# Patient Record
Sex: FEMALE | Race: OTHER | Employment: UNEMPLOYED | URBAN - METROPOLITAN AREA
[De-identification: names, ages, dates, MRNs, and addresses within clinical notes are randomized per-mention and may not be internally consistent; named-entity substitution may affect disease eponyms.]

---

## 2020-06-21 ENCOUNTER — APPOINTMENT (OUTPATIENT)
Dept: CT IMAGING | Age: 60
End: 2020-06-21
Attending: NURSE PRACTITIONER
Payer: MEDICAID

## 2020-06-21 ENCOUNTER — HOSPITAL ENCOUNTER (OUTPATIENT)
Age: 60
Setting detail: OBSERVATION
Discharge: HOME | End: 2020-06-22
Attending: EMERGENCY MEDICINE | Admitting: HOSPITALIST
Payer: MEDICAID

## 2020-06-21 ENCOUNTER — APPOINTMENT (OUTPATIENT)
Dept: GENERAL RADIOLOGY | Age: 60
End: 2020-06-21
Attending: NURSE PRACTITIONER
Payer: MEDICAID

## 2020-06-21 DIAGNOSIS — R55 VASOVAGAL EPISODE: ICD-10-CM

## 2020-06-21 DIAGNOSIS — G45.9 TIA (TRANSIENT ISCHEMIC ATTACK): Primary | ICD-10-CM

## 2020-06-21 DIAGNOSIS — R55 SYNCOPE AND COLLAPSE: ICD-10-CM

## 2020-06-21 LAB
ALBUMIN SERPL-MCNC: 4.1 G/DL (ref 3.4–5)
ALBUMIN/GLOB SERPL: 1 {RATIO} (ref 0.8–1.7)
ALP SERPL-CCNC: 110 U/L (ref 45–117)
ALT SERPL-CCNC: 103 U/L (ref 13–56)
ANION GAP SERPL CALC-SCNC: 9 MMOL/L (ref 3–18)
AST SERPL-CCNC: 70 U/L (ref 10–38)
BASOPHILS # BLD: 0.1 K/UL (ref 0–0.1)
BASOPHILS NFR BLD: 1 % (ref 0–2)
BILIRUB SERPL-MCNC: 0.4 MG/DL (ref 0.2–1)
BUN SERPL-MCNC: 15 MG/DL (ref 7–18)
BUN/CREAT SERPL: 20 (ref 12–20)
CALCIUM SERPL-MCNC: 9.5 MG/DL (ref 8.5–10.1)
CHLORIDE SERPL-SCNC: 107 MMOL/L (ref 100–111)
CK MB CFR SERPL CALC: 0.6 % (ref 0–4)
CK MB SERPL-MCNC: 1 NG/ML (ref 5–25)
CK SERPL-CCNC: 157 U/L (ref 26–192)
CO2 SERPL-SCNC: 24 MMOL/L (ref 21–32)
CREAT SERPL-MCNC: 0.74 MG/DL (ref 0.6–1.3)
DIFFERENTIAL METHOD BLD: NORMAL
EOSINOPHIL # BLD: 0 K/UL (ref 0–0.4)
EOSINOPHIL NFR BLD: 0 % (ref 0–5)
ERYTHROCYTE [DISTWIDTH] IN BLOOD BY AUTOMATED COUNT: 13.4 % (ref 11.6–14.5)
GLOBULIN SER CALC-MCNC: 4.3 G/DL (ref 2–4)
GLUCOSE SERPL-MCNC: 106 MG/DL (ref 74–99)
HCT VFR BLD AUTO: 40.8 % (ref 35–45)
HGB BLD-MCNC: 13.5 G/DL (ref 12–16)
LYMPHOCYTES # BLD: 2.1 K/UL (ref 0.9–3.6)
LYMPHOCYTES NFR BLD: 28 % (ref 21–52)
MAGNESIUM SERPL-MCNC: 2.4 MG/DL (ref 1.6–2.6)
MCH RBC QN AUTO: 29.5 PG (ref 24–34)
MCHC RBC AUTO-ENTMCNC: 33.1 G/DL (ref 31–37)
MCV RBC AUTO: 89.1 FL (ref 74–97)
MONOCYTES # BLD: 0.5 K/UL (ref 0.05–1.2)
MONOCYTES NFR BLD: 7 % (ref 3–10)
NEUTS SEG # BLD: 5 K/UL (ref 1.8–8)
NEUTS SEG NFR BLD: 64 % (ref 40–73)
PLATELET # BLD AUTO: 333 K/UL (ref 135–420)
PMV BLD AUTO: 10.4 FL (ref 9.2–11.8)
POTASSIUM SERPL-SCNC: 3.9 MMOL/L (ref 3.5–5.5)
PROT SERPL-MCNC: 8.4 G/DL (ref 6.4–8.2)
RBC # BLD AUTO: 4.58 M/UL (ref 4.2–5.3)
SODIUM SERPL-SCNC: 140 MMOL/L (ref 136–145)
TROPONIN I SERPL-MCNC: <0.02 NG/ML (ref 0–0.04)
WBC # BLD AUTO: 7.6 K/UL (ref 4.6–13.2)

## 2020-06-21 PROCEDURE — 74011250637 HC RX REV CODE- 250/637: Performed by: NURSE PRACTITIONER

## 2020-06-21 PROCEDURE — 71045 X-RAY EXAM CHEST 1 VIEW: CPT

## 2020-06-21 PROCEDURE — 80053 COMPREHEN METABOLIC PANEL: CPT

## 2020-06-21 PROCEDURE — 74011250636 HC RX REV CODE- 250/636: Performed by: HOSPITALIST

## 2020-06-21 PROCEDURE — 85025 COMPLETE CBC W/AUTO DIFF WBC: CPT

## 2020-06-21 PROCEDURE — 96372 THER/PROPH/DIAG INJ SC/IM: CPT

## 2020-06-21 PROCEDURE — 82550 ASSAY OF CK (CPK): CPT

## 2020-06-21 PROCEDURE — 99218 HC RM OBSERVATION: CPT

## 2020-06-21 PROCEDURE — 83735 ASSAY OF MAGNESIUM: CPT

## 2020-06-21 PROCEDURE — 93005 ELECTROCARDIOGRAM TRACING: CPT

## 2020-06-21 PROCEDURE — 74011250637 HC RX REV CODE- 250/637: Performed by: HOSPITALIST

## 2020-06-21 PROCEDURE — 65660000000 HC RM CCU STEPDOWN

## 2020-06-21 PROCEDURE — 70450 CT HEAD/BRAIN W/O DYE: CPT

## 2020-06-21 PROCEDURE — 99285 EMERGENCY DEPT VISIT HI MDM: CPT

## 2020-06-21 RX ORDER — ASPIRIN 325 MG
325 TABLET ORAL
Status: COMPLETED | OUTPATIENT
Start: 2020-06-21 | End: 2020-06-21

## 2020-06-21 RX ORDER — GUAIFENESIN 100 MG/5ML
81 LIQUID (ML) ORAL DAILY
Status: DISCONTINUED | OUTPATIENT
Start: 2020-06-22 | End: 2020-06-22 | Stop reason: HOSPADM

## 2020-06-21 RX ORDER — ACETAMINOPHEN 500 MG
500 TABLET ORAL
Status: DISCONTINUED | OUTPATIENT
Start: 2020-06-21 | End: 2020-06-22 | Stop reason: HOSPADM

## 2020-06-21 RX ORDER — ENOXAPARIN SODIUM 100 MG/ML
40 INJECTION SUBCUTANEOUS EVERY 24 HOURS
Status: DISCONTINUED | OUTPATIENT
Start: 2020-06-21 | End: 2020-06-22 | Stop reason: HOSPADM

## 2020-06-21 RX ORDER — ONDANSETRON 2 MG/ML
4 INJECTION INTRAMUSCULAR; INTRAVENOUS
Status: DISCONTINUED | OUTPATIENT
Start: 2020-06-21 | End: 2020-06-22 | Stop reason: HOSPADM

## 2020-06-21 RX ORDER — LORAZEPAM 2 MG/ML
1 INJECTION INTRAMUSCULAR
Status: COMPLETED | OUTPATIENT
Start: 2020-06-22 | End: 2020-06-22

## 2020-06-21 RX ORDER — ACETAMINOPHEN 325 MG/1
650 TABLET ORAL
Status: COMPLETED | OUTPATIENT
Start: 2020-06-21 | End: 2020-06-21

## 2020-06-21 RX ORDER — ATORVASTATIN CALCIUM 40 MG/1
80 TABLET, FILM COATED ORAL
Status: DISCONTINUED | OUTPATIENT
Start: 2020-06-21 | End: 2020-06-22 | Stop reason: HOSPADM

## 2020-06-21 RX ADMIN — ENOXAPARIN SODIUM 40 MG: 40 INJECTION SUBCUTANEOUS at 23:44

## 2020-06-21 RX ADMIN — ATORVASTATIN CALCIUM 80 MG: 40 TABLET, FILM COATED ORAL at 23:44

## 2020-06-21 RX ADMIN — ASPIRIN 325 MG ORAL TABLET 325 MG: 325 PILL ORAL at 23:44

## 2020-06-21 RX ADMIN — ACETAMINOPHEN 650 MG: 325 TABLET ORAL at 21:47

## 2020-06-21 NOTE — ED TRIAGE NOTES
Pt presents via EMS from home after a syncopal episode lasting aprox 10 min per family report. Per EMS report patient 's/100's with complaints of headache. Pt blood glucose 129mg/dl. Upon arrival patient AAOx4, VSS.     Past Medical History:   Diagnosis Date    Arthritis     Carpal tunnel syndrome     left    Elevated cholesterol     Hypertension     Numbness     left hand     Shortness of breath     Wrist pain     left

## 2020-06-21 NOTE — ED PROVIDER NOTES
[facility]  Emergency Department Treatment Report        7:40 PM Cheri Skinner is a 60 y.o. female with a history of hypertension and diabetes who presents to ED after a episode of syncope. Patient had family drama and up sentiment today and went to hug someone on the front lawn and fainted patient was reportedly out for 10 minutes when she woke she had difficulty moving her left arm and gripping with that left hand. No facial droop was noted at any time patient does report a headache prior to it happening and a headache at this time. Patient does have a history of fainting like this with emotional upset in the past.  At this point in time patient only reports a headache. No other complaints, associated symptoms or modifying factors at this time. PCP: Sonny Gandara      The history is provided by the patient. The history is limited by a language barrier. A  was used (Patient's daughter consented to translating for her mother and [place] and [facility] [place] [place] language). Syncope   This is a recurrent problem. The current episode started 3 to 5 hours ago. The problem has been gradually improving. Associated symptoms include headaches. Pertinent negatives include no chest pain, no abdominal pain and no shortness of breath. Nothing aggravates the symptoms. Nothing relieves the symptoms. She has tried nothing for the symptoms.         Past Medical History:   Diagnosis Date    Arthritis     Carpal tunnel syndrome     left    Diabetes (HCC)     Elevated cholesterol     Hypertension     Numbness     left hand     Shortness of breath     Wrist pain     left       Past Surgical History:   Procedure Laterality Date    HX APPENDECTOMY      HX HYSTERECTOMY           Family History:   Problem Relation Age of Onset    Cancer Sister     Liver Disease Brother        Social History     Socioeconomic History    Marital status:      Spouse name: Not on file    Number of children: Not on file    Years of education: Not on file    Highest education level: Not on file   Occupational History    Not on file   Social Needs    Financial resource strain: Not on file    Food insecurity     Worry: Not on file     Inability: Not on file    Transportation needs     Medical: Not on file     Non-medical: Not on file   Tobacco Use    Smoking status: Never Smoker    Smokeless tobacco: Never Used   Substance and Sexual Activity    Alcohol use: No    Drug use: No    Sexual activity: Not on file   Lifestyle    Physical activity     Days per week: Not on file     Minutes per session: Not on file    Stress: Not on file   Relationships    Social connections     Talks on phone: Not on file     Gets together: Not on file     Attends [employer] witness service: Not on file     Active member of club or organization: Not on file     Attends meetings of clubs or organizations: Not on file     Relationship status: Not on file    Intimate partner violence     Fear of current or ex partner: Not on file     Emotionally abused: Not on file     Physically abused: Not on file     Forced sexual activity: Not on file   Other Topics Concern    Not on file   Social History Narrative    Not on file         ALLERGIES: Patient has no known allergies. Review of Systems   Constitutional: Negative for fever. Respiratory: Negative for chest tightness and shortness of breath. Cardiovascular: Positive for syncope. Negative for chest pain and palpitations. Gastrointestinal: Negative for abdominal pain. Neurological: Positive for dizziness, syncope and headaches. Negative for tremors, seizures, speech difficulty and weakness. All other systems reviewed and are negative. Vitals:    06/21/20 1838 06/21/20 1915   BP: 166/84 156/81   Pulse: 95 95   Resp: 20 20   Temp: 99.9 °F (37.7 °C)    SpO2: 96% 95%            Physical Exam  Vitals signs and nursing note reviewed. Constitutional:       Appearance: She is well-developed. HENT:      Head: Normocephalic and atraumatic. Eyes:      General: No visual field deficit. Conjunctiva/sclera: Conjunctivae normal.      Pupils: Pupils are equal, round, and reactive to light. Neck:      Musculoskeletal: Normal range of motion and neck supple. Cardiovascular:      Rate and Rhythm: Normal rate and regular rhythm. Pulmonary:      Effort: Pulmonary effort is normal.      Breath sounds: Normal breath sounds. Abdominal:      General: Bowel sounds are normal.      Palpations: Abdomen is soft. Musculoskeletal: Normal range of motion. Skin:     General: Skin is warm and dry. Neurological:      Mental Status: She is alert and oriented to person, place, and time. GCS: GCS eye subscore is 4. GCS verbal subscore is 5. GCS motor subscore is 6. Cranial Nerves: Cranial nerve deficit present. No facial asymmetry. Sensory: Sensation is intact. Motor: Weakness present. No atrophy or abnormal muscle tone. Gait: Gait is intact. Deep Tendon Reflexes: Reflexes are normal and symmetric. Comments: Tongue deviates to right    Mild left-sided weakness to hands with  strength and leg on left side    Gait observed normal, when going into see patient she had ambulated to the bathroom   Psychiatric:         Behavior: Behavior normal.         Thought Content: Thought content normal.         Judgment: Judgment normal.          MDM  Number of Diagnoses or Management Options  Syncope and collapse:   TIA (transient ischemic attack):   Vasovagal episode:   Diagnosis management comments: After a quick neuro exam patient had loss of consciousness for 10 minutes had trouble gripping and moving her left arm after fainting. She had this symptom for 15 minutes and on exam patient had a faint left-sided weakness and a feet deviation of her tongue to the right.    Stroke called tele-neuro consult placed    Discussed with Dr. Reanna Mcnally with tele-neuro he examined the patient and he thinks the patient either had a TIA or a vasovagal episode with syncope. He suggested patient get aspirin get admitted to the hospital for an MRI and further evaluation. Discussed with , hospitalist she accepted the patient for admission to the neuro telemetry floor    In to discuss disposition and admission with daughter and patient       Amount and/or Complexity of Data Reviewed  Clinical lab tests: ordered and reviewed  Tests in the radiology section of CPT®: ordered and reviewed  Review and summarize past medical records: yes  Discuss the patient with other providers: yes  Independent visualization of images, tracings, or specimens: yes    Risk of Complications, Morbidity, and/or Mortality  Presenting problems: moderate  Diagnostic procedures: moderate  Management options: moderate    Critical Care  Total time providing critical care: < 30 minutes (I have personally provided _30__ minutes of critical care time exclusive of time spent on separately billable procedures. Time includes review of laboratory data, radiology results, discussion with consultants, and monitoring for potential decompensation.  Interventions were performed as documented above.  )    Patient Progress  Patient progress: stable         Procedures            Vitals:  Patient Vitals for the past 12 hrs:   Temp Pulse Resp BP SpO2   06/21/20 1915  95 20 156/81 95 %   06/21/20 1838 99.9 °F (37.7 °C) 95 20 166/84 96 %       Medications ordered:   Medications - No data to display      Lab findings:  Recent Results (from the past 12 hour(s))   CBC WITH AUTOMATED DIFF    Collection Time: 06/21/20  6:32 PM   Result Value Ref Range    WBC 7.6 4.6 - 13.2 K/uL    RBC 4.58 4.20 - 5.30 M/uL    HGB 13.5 12.0 - 16.0 g/dL    HCT 40.8 35.0 - 45.0 %    MCV 89.1 74.0 - 97.0 FL    MCH 29.5 24.0 - 34.0 PG    MCHC 33.1 31.0 - 37.0 g/dL    RDW 13.4 11.6 - 14.5 %    PLATELET 115 177 - 285 K/uL    MPV 10.4 9.2 - 11.8 FL    NEUTROPHILS 64 40 - 73 % LYMPHOCYTES 28 21 - 52 %    MONOCYTES 7 3 - 10 %    EOSINOPHILS 0 0 - 5 %    BASOPHILS 1 0 - 2 %    ABS. NEUTROPHILS 5.0 1.8 - 8.0 K/UL    ABS. LYMPHOCYTES 2.1 0.9 - 3.6 K/UL    ABS. MONOCYTES 0.5 0.05 - 1.2 K/UL    ABS. EOSINOPHILS 0.0 0.0 - 0.4 K/UL    ABS. BASOPHILS 0.1 0.0 - 0.1 K/UL    DF AUTOMATED     METABOLIC PANEL, COMPREHENSIVE    Collection Time: 06/21/20  6:32 PM   Result Value Ref Range    Sodium 140 136 - 145 mmol/L    Potassium 3.9 3.5 - 5.5 mmol/L    Chloride 107 100 - 111 mmol/L    CO2 24 21 - 32 mmol/L    Anion gap 9 3.0 - 18 mmol/L    Glucose 106 (H) 74 - 99 mg/dL    BUN 15 7.0 - 18 MG/DL    Creatinine 0.74 0.6 - 1.3 MG/DL    BUN/Creatinine ratio 20 12 - 20      GFR est AA >60 >60 ml/min/1.73m2    GFR est non-AA >60 >60 ml/min/1.73m2    Calcium 9.5 8.5 - 10.1 MG/DL    Bilirubin, total 0.4 0.2 - 1.0 MG/DL    ALT (SGPT) 103 (H) 13 - 56 U/L    AST (SGOT) 70 (H) 10 - 38 U/L    Alk.  phosphatase 110 45 - 117 U/L    Protein, total 8.4 (H) 6.4 - 8.2 g/dL    Albumin 4.1 3.4 - 5.0 g/dL    Globulin 4.3 (H) 2.0 - 4.0 g/dL    A-G Ratio 1.0 0.8 - 1.7     MAGNESIUM    Collection Time: 06/21/20  6:32 PM   Result Value Ref Range    Magnesium 2.4 1.6 - 2.6 mg/dL   CARDIAC PANEL,(CK, CKMB & TROPONIN)    Collection Time: 06/21/20  6:32 PM   Result Value Ref Range    CK - MB 1.0 <3.6 ng/ml    CK-MB Index 0.6 0.0 - 4.0 %     26 - 192 U/L    Troponin-I, QT <0.02 0.0 - 0.045 NG/ML   EKG, 12 LEAD, INITIAL    Collection Time: 06/21/20  6:50 PM   Result Value Ref Range    Ventricular Rate 92 BPM    Atrial Rate 92 BPM    P-R Interval 152 ms    QRS Duration 76 ms    Q-T Interval 364 ms    QTC Calculation (Bezet) 450 ms    Calculated P Axis 52 degrees    Calculated R Axis 28 degrees    Calculated T Axis 28 degrees    Diagnosis       Normal sinus rhythm  Normal ECG  No previous ECGs available             X-Ray, CT or other radiology findings or impressions:  XR CHEST PORT    (Results Pending)   CT HEAD WO CONT    (Results Pending)     Radiology: CT of head was negative chest x-ray negative      Progress notes, Consult notes or additional Procedure notes:     Dr. Romana Pierce with tele-neuro for consultation and stroke alert    Discussed with , hospitalist.       Disposition:    Diagnosis:   1. TIA (transient ischemic attack)    2. Vasovagal episode    3. Syncope and collapse        Disposition: Admitted           Patient's Medications   Start Taking    No medications on file   Continue Taking    CELECOXIB (CELEBREX) 100 MG CAPSULE    Take  by mouth daily. CHOLECALCIFEROL (VITAMIN D3) 1,000 UNIT CAP    Take  by mouth daily. HYDROCHLOROTHIAZIDE (HYDRODIURIL) 25 MG TABLET    Take 12.5 mg by mouth daily. HYDROCODONE-ACETAMINOPHEN (VICODIN) 5-300 MG TABLET    Take 1 Tab by mouth every four (4) hours as needed. Max Daily Amount: 6 Tabs. SIMVASTATIN (ZOCOR) 10 MG TABLET    Take 20 mg by mouth nightly. These Medications have changed    No medications on file   Stop Taking    No medications on file         ADMISSION NOTE:  8:44 PM  Patient is being admitted to the hospital by Dr. Adal Stroud. The results of their tests and reasons for their admission have been discussed with them and/or available family. They convey agreement and understanding for the need to be admitted and for their admission diagnosis. Julian Ta ENP-C,FNP-C      Dragon voice recognition was used to generate this report, which may have resulted in some phonetic based errors in grammar and contents.  Even though attempts were made to correct all the mistakes, some may have been missed, and remained in the body of the document

## 2020-06-21 NOTE — ED NOTES
Per Daughter patient had a aprox 10 min syncopal episode today following a family conflict and has had other syncopal episodes in the past during stressful family events. Currently patient is AAOx4, VSS, neuro checks complete and no deficits discovered.

## 2020-06-22 ENCOUNTER — APPOINTMENT (OUTPATIENT)
Dept: MRI IMAGING | Age: 60
End: 2020-06-22
Attending: HOSPITALIST
Payer: MEDICAID

## 2020-06-22 VITALS
TEMPERATURE: 97.9 F | DIASTOLIC BLOOD PRESSURE: 75 MMHG | HEART RATE: 90 BPM | SYSTOLIC BLOOD PRESSURE: 123 MMHG | RESPIRATION RATE: 16 BRPM | OXYGEN SATURATION: 94 %

## 2020-06-22 LAB
ANION GAP SERPL CALC-SCNC: 6 MMOL/L (ref 3–18)
ATRIAL RATE: 92 BPM
BUN SERPL-MCNC: 15 MG/DL (ref 7–18)
BUN/CREAT SERPL: 26 (ref 12–20)
CALCIUM SERPL-MCNC: 9.3 MG/DL (ref 8.5–10.1)
CALCULATED P AXIS, ECG09: 52 DEGREES
CALCULATED R AXIS, ECG10: 28 DEGREES
CALCULATED T AXIS, ECG11: 28 DEGREES
CHLORIDE SERPL-SCNC: 106 MMOL/L (ref 100–111)
CHOLEST SERPL-MCNC: 224 MG/DL
CO2 SERPL-SCNC: 26 MMOL/L (ref 21–32)
CREAT SERPL-MCNC: 0.58 MG/DL (ref 0.6–1.3)
DIAGNOSIS, 93000: NORMAL
EST. AVERAGE GLUCOSE BLD GHB EST-MCNC: 137 MG/DL
GLUCOSE BLD STRIP.AUTO-MCNC: 109 MG/DL (ref 70–110)
GLUCOSE SERPL-MCNC: 97 MG/DL (ref 74–99)
HBA1C MFR BLD: 6.4 % (ref 4.2–5.6)
HDLC SERPL-MCNC: 51 MG/DL (ref 40–60)
HDLC SERPL: 4.4 {RATIO} (ref 0–5)
LDLC SERPL CALC-MCNC: 113.8 MG/DL (ref 0–100)
LIPID PROFILE,FLP: ABNORMAL
MAGNESIUM SERPL-MCNC: 2.6 MG/DL (ref 1.6–2.6)
P-R INTERVAL, ECG05: 152 MS
POTASSIUM SERPL-SCNC: 3.9 MMOL/L (ref 3.5–5.5)
Q-T INTERVAL, ECG07: 364 MS
QRS DURATION, ECG06: 76 MS
QTC CALCULATION (BEZET), ECG08: 450 MS
SODIUM SERPL-SCNC: 138 MMOL/L (ref 136–145)
TRIGL SERPL-MCNC: 296 MG/DL (ref ?–150)
VENTRICULAR RATE, ECG03: 92 BPM
VLDLC SERPL CALC-MCNC: 59.2 MG/DL

## 2020-06-22 PROCEDURE — 97162 PT EVAL MOD COMPLEX 30 MIN: CPT

## 2020-06-22 PROCEDURE — 74011250637 HC RX REV CODE- 250/637: Performed by: HOSPITALIST

## 2020-06-22 PROCEDURE — 83036 HEMOGLOBIN GLYCOSYLATED A1C: CPT

## 2020-06-22 PROCEDURE — 99218 HC RM OBSERVATION: CPT

## 2020-06-22 PROCEDURE — 36415 COLL VENOUS BLD VENIPUNCTURE: CPT

## 2020-06-22 PROCEDURE — 70551 MRI BRAIN STEM W/O DYE: CPT

## 2020-06-22 PROCEDURE — 96374 THER/PROPH/DIAG INJ IV PUSH: CPT

## 2020-06-22 PROCEDURE — 97116 GAIT TRAINING THERAPY: CPT

## 2020-06-22 PROCEDURE — 74011250636 HC RX REV CODE- 250/636: Performed by: HOSPITALIST

## 2020-06-22 PROCEDURE — 82962 GLUCOSE BLOOD TEST: CPT

## 2020-06-22 PROCEDURE — 83735 ASSAY OF MAGNESIUM: CPT

## 2020-06-22 PROCEDURE — 80048 BASIC METABOLIC PNL TOTAL CA: CPT

## 2020-06-22 PROCEDURE — 80061 LIPID PANEL: CPT

## 2020-06-22 RX ORDER — GUAIFENESIN 100 MG/5ML
81 LIQUID (ML) ORAL DAILY
Qty: 30 TAB | Refills: 0 | Status: SHIPPED | OUTPATIENT
Start: 2020-06-23 | End: 2021-01-05 | Stop reason: SDUPTHER

## 2020-06-22 RX ORDER — ATORVASTATIN CALCIUM 80 MG/1
80 TABLET, FILM COATED ORAL
Qty: 30 TAB | Refills: 0 | Status: SHIPPED | OUTPATIENT
Start: 2020-06-22 | End: 2020-11-03 | Stop reason: SDUPTHER

## 2020-06-22 RX ADMIN — ASPIRIN 81 MG 81 MG: 81 TABLET ORAL at 09:43

## 2020-06-22 RX ADMIN — LORAZEPAM 1 MG: 2 INJECTION INTRAMUSCULAR; INTRAVENOUS at 00:40

## 2020-06-22 NOTE — PROGRESS NOTES
SLP Note:    Pt seen with dtr present at bedside who assisted with communication. Denied dysphagia, speech or voice changes. No overt distress noted during breakfast meal.  Educated with regard to s/sx aspiration, role of SLP and to alert MD/RN if symptoms arise. Verbalized understanding. Will sign off as skilled SLP evaluation not indicated at this time.       Thank you for this referral,   Paulette Silver M.S., [id number] [facility]  Speech-Language Pathologist

## 2020-06-22 NOTE — PROGRESS NOTES
ARU/IPR REFERRAL CONTACT NOTE  [id number] [facility]    RE: Siomara Adair    Referral received to review this patient's case for admission to [id number] [facility]. Current status reviewed.  When appropriate, will need PT/OT evaluation/treatment on this patient to complete the pre-admission evaluation.  Will continue to follow. Thank you for this referral.  Should you have any questions please do not hesitate to call. Sincerely,  Andra Aceves.  [address] [place] [address] Physical Rehabilitation  [phone]

## 2020-06-22 NOTE — PROGRESS NOTES
OT order received and chart reviewed. Spoke with patient in her room and she declined need for skilled OT at this time. Patient reports being modified independent with basic self care tasks. Per PT, she requires CGA for mobility secondary to dizziness; will defer to PT for mobility training. She lives with her family who can assist her prn. No skilled OT indicated at this time; will complete the order.   Thank you for the referral.  Jearldine Hashimoto, MS OTR/L

## 2020-06-22 NOTE — PROGRESS NOTES
Reason for Admission:  Vasovagal episode [R55]  TIA (transient ischemic attack) [G45.9]                 RRAT Score:    5            Plan for utilizing home health: To be determined                      Likelihood of Readmission:   LOW                         Transition of Care Plan:              Initial assessment completed with patient and her son Kaylee Nicolas [id number]. Cognitive status of patient: oriented to time, place, person and situation. Face sheet information confirmed:  yes. The patient designates her Son Nitish Dunbar to participate in her discharge plan and to receive any needed information. This patient lives in a home with son. Patient is able to navigate steps as needed. Prior to hospitalization, patient was considered to be independent with ADLs/IADLS : yes . Patient has a current ACP document on file: no  The son will be available to transport patient home upon discharge. The patient already has no medical equipment available in the home. Patient is not currently active with home health. Patient has not stayed in a skilled nursing facility or rehab. Was  stay within last 60 days : no. This patient is on dialysis :no    Currently, the discharge plan is to be determined    The patient states that she can obtain her medications from the pharmacy, and take her medications as directed. Patient's current insurance is none. Left a message for Shaletta of [employer] to screen pt for Medicaid      Care Management Interventions  PCP Verified by CM: No(per pt, [facility] [address] [facility] (RRAT>21 & CHF Dx)?: No  Mode of Transport at Discharge:  Other (see comment)(family)  Transition of Care Consult (CM Consult): Discharge Planning  Physical Therapy Consult: Yes  Occupational Therapy Consult: Yes  Speech Therapy Consult: No  Current Support Network: Relative's Home(lives with her son Tresa Enriquez)  Confirm Follow Up Transport: Family  Discharge Location  Discharge Placement: Unable to determine at this time        JUAN Paredes RN  Care Management  Pager: [phone]

## 2020-06-22 NOTE — ROUTINE PROCESS
Discharge instructions reviewed with son as patient has limited [place] proficiency, son expressed understanding, prescription scripts given to son, and DC papers signed by patient. Patient left unit at

## 2020-06-22 NOTE — CONSULTS
NEUROLOGY CONSULT NOTE    Patient ID:  Mendel Atkins  [id number]  49 y.o.  1960    Date of Consultation:  June 22, 2020    Referring Physician: Jose A Vazquez MD    Reason for Consultation:  Dizziness/TIA        Subjective:       History of Present Illness:     Ms Bautista Espinoza is a 16-year-old woman, born in [place], with past medical history of hypertension, osteoarthritis, hyperlipidemia, who was admitted on 21 June 2020 after she became suddenly dizzy, shaking and confused. Paramedics found a systolic blood pressure in 230. Her symptoms started in the setting of a tests discussion with the family when she got very upset. When she was evaluated by tele-neurology she had a left pronator drift. Today she is feeling better. She is able to ambulate. She states that at home she is walking every day and that she is preparing meals and taking care of the family. She still has some problems with left upper extremity but seems to be related to her shoulder. She denies headache, no language problem. No loss of sensation on any side of the body. Brain MRI was negative for acute stroke. Patient Active Problem List    Diagnosis Date Noted    TIA (transient ischemic attack) 06/21/2020    Vasovagal episode 06/21/2020     Past Medical History:   Diagnosis Date    Arthritis     Carpal tunnel syndrome     left    Diabetes ([facility] [address]     Elevated cholesterol     Hypertension     Numbness     left hand     Shortness of breath     Wrist pain     left      Past Surgical History:   Procedure Laterality Date    HX APPENDECTOMY      HX HYSTERECTOMY        Prior to Admission medications    Medication Sig Start Date End Date Taking? Authorizing Provider   HYDROcodone-acetaminophen (VICODIN) 5-300 mg tablet Take 1 Tab by mouth every four (4) hours as needed. Max Daily Amount: 6 Tabs. 10/30/17   Arlina Severe, MD   simvastatin (ZOCOR) 10 mg tablet Take 20 mg by mouth nightly.     Provider, Historical hydroCHLOROthiazide (HYDRODIURIL) 25 mg tablet Take 12.5 mg by mouth daily. Michelle Wolfe MD   celecoxib (CELEBREX) 100 mg capsule Take  by mouth daily. Michelle Wolfe MD   cholecalciferol (VITAMIN D3) 1,000 unit cap Take  by mouth daily. Michelle Wolfe MD     No Known Allergies   Social History     Tobacco Use    Smoking status: Never Smoker    Smokeless tobacco: Never Used   Substance Use Topics    Alcohol use: No      Family History   Problem Relation Age of Onset    Cancer Sister     Liver Disease Brother         Review of Systems    Constitutional: No recent weight change, fever,fatigue, sleep difficulties, or loss of appetite. ENT/Mouth:  No hearing loss, ringing in the ears, chronic sinus problem, nose bleeds sore throat, voice change, hoarseness, swollen glands in neck, or difficulties with chewing and swallowing. Cardiovascular:  No chest pain/angina pectoris, palpitations,swelling of feet/ankles/hands, or calf pain while walking. Respiratory: No chronic or frequent coughs, spitting up blood, shortness of breath, asthma, or wheezing. Gastrointestinal: No abdominal pain, heartburn, nausea, vomiting, constipation, frequent diarrhea, rectal bleeding, or blood in stool. Genitourinary: No frequent urination, burning or painful urination, blood in urine, incontinence or dribbling. Musculoskeletal:   No joint pain, stiffness/swelling, weakness of muscles, muscle pain/cramp, or back pain. Integument:   No rash/itching, change in skin color, change in hair/nails, or change in color/size of moles. Neurological:  No dizziness/vertigo, loss of consciousness, numbness/tingling sensation, tremors, weakness in limbs, difficulty with balance, frequent or recurring headaches, memory loss or confusion. Psychiatric:   No nervousness, depression, hallucinations, paranoia or suspiciousness. Endocrine: No excessive thirst or urination, heat or cold intolerance.    Hematologic/Lymphatic: No bleeding/bruising tendency, phlebitis, or past transfusion. Objective:     Patient Vitals for the past 8 hrs:   BP Temp Pulse Resp SpO2   06/22/20 0800 157/84 98.1 °F (36.7 °C) 85 18 94 %   06/22/20 0756     98 %   06/22/20 0400 139/88 98.2 °F (36.8 °C) 68 17 98 %   06/22/20 0200 132/67 98.2 °F (36.8 °C) 68 18 96 %       General Exam  No acute distress, normal body habitus    HEENT: Normocephalic, atraumatic, Sclera anicteric, normal conjunctiva  Mucous membranes: normal color and hydration     CV: No carotid bruits,   Heart: regular to rate and rhythm. No murmurs     Neurologic Exam:    MENTAL STATUS:     The patient is awake, alert, and oriented x 4. Fund of knowledge is adequate. Speech is fluent and memory appears to be intact, both long and short term. CRANIAL NERVES:   II  Visual fields are normal. Pupils are both 4 mm and briskly reactive to light and accommodation. III, IV, VI  Extraocular movements are intact and there is no nystagmus. V  Facial sensation is intact to pinprick and light touch. VII  Face is symmetrical.   VIII - Hearing is present. IX, X, [address] rises symmetrically. Gag is present. Tongue is in the midline. XI - Shoulder shrugging and head turning intact  MOTOR:          The patient is 5/5 in all four limbs without any drift. No involuntary movements    SENSATION:  Sensory examination is intact to pinprick, light touch and position sense testing. REFLEXES: 2+ and symmetrical. Plantars are down going. COORDINATION: Cerebellar examination reveals no gross ataxia or dysmetria.                  GAIT: Gait is  cautioned but normal      DEata Review:    Recent Results (from the past 24 hour(s))   CBC WITH AUTOMATED DIFF    Collection Time: 06/21/20  6:32 PM   Result Value Ref Range    WBC 7.6 4.6 - 13.2 K/uL    RBC 4.58 4.20 - 5.30 M/uL    HGB 13.5 12.0 - 16.0 g/dL    HCT 40.8 35.0 - 45.0 %    MCV 89.1 74.0 - 97.0 FL    MCH 29.5 24.0 - 34.0 PG    MCHC 33.1 31.0 - 37.0 g/dL    RDW 13.4 11.6 - 14.5 %    PLATELET 178 801 - 899 K/uL    MPV 10.4 9.2 - 11.8 FL    NEUTROPHILS 64 40 - 73 %    LYMPHOCYTES 28 21 - 52 %    MONOCYTES 7 3 - 10 %    EOSINOPHILS 0 0 - 5 %    BASOPHILS 1 0 - 2 %    ABS. NEUTROPHILS 5.0 1.8 - 8.0 K/UL    ABS. LYMPHOCYTES 2.1 0.9 - 3.6 K/UL    ABS. MONOCYTES 0.5 0.05 - 1.2 K/UL    ABS. EOSINOPHILS 0.0 0.0 - 0.4 K/UL    ABS. BASOPHILS 0.1 0.0 - 0.1 K/UL    DF AUTOMATED     METABOLIC PANEL, COMPREHENSIVE    Collection Time: 06/21/20  6:32 PM   Result Value Ref Range    Sodium 140 136 - 145 mmol/L    Potassium 3.9 3.5 - 5.5 mmol/L    Chloride 107 100 - 111 mmol/L    CO2 24 21 - 32 mmol/L    Anion gap 9 3.0 - 18 mmol/L    Glucose 106 (H) 74 - 99 mg/dL    BUN 15 7.0 - 18 MG/DL    Creatinine 0.74 0.6 - 1.3 MG/DL    BUN/Creatinine ratio 20 12 - 20      GFR est AA >60 >60 ml/min/1.73m2    GFR est non-AA >60 >60 ml/min/1.73m2    Calcium 9.5 8.5 - 10.1 MG/DL    Bilirubin, total 0.4 0.2 - 1.0 MG/DL    ALT (SGPT) 103 (H) 13 - 56 U/L    AST (SGOT) 70 (H) 10 - 38 U/L    Alk.  phosphatase 110 45 - 117 U/L    Protein, total 8.4 (H) 6.4 - 8.2 g/dL    Albumin 4.1 3.4 - 5.0 g/dL    Globulin 4.3 (H) 2.0 - 4.0 g/dL    A-G Ratio 1.0 0.8 - 1.7     MAGNESIUM    Collection Time: 06/21/20  6:32 PM   Result Value Ref Range    Magnesium 2.4 1.6 - 2.6 mg/dL   CARDIAC PANEL,(CK, CKMB & TROPONIN)    Collection Time: 06/21/20  6:32 PM   Result Value Ref Range    CK - MB 1.0 <3.6 ng/ml    CK-MB Index 0.6 0.0 - 4.0 %     26 - 192 U/L    Troponin-I, QT <0.02 0.0 - 0.045 NG/ML   EKG, 12 LEAD, INITIAL    Collection Time: 06/21/20  6:50 PM   Result Value Ref Range    Ventricular Rate 92 BPM    Atrial Rate 92 BPM    P-R Interval 152 ms    QRS Duration 76 ms    Q-T Interval 364 ms    QTC Calculation (Bezet) 450 ms    Calculated P Axis 52 degrees    Calculated R Axis 28 degrees    Calculated T Axis 28 degrees    Diagnosis       Normal sinus rhythm  Normal ECG  No previous ECGs available     LIPID PANEL    Collection Time: 06/22/20  3:23 AM   Result Value Ref Range    LIPID PROFILE          Cholesterol, total 224 (H) <200 MG/DL    Triglyceride 296 (H) <150 MG/DL    HDL Cholesterol 51 40 - 60 MG/DL    LDL, calculated 113.8 (H) 0 - 100 MG/DL    VLDL, calculated 59.2 MG/DL    CHOL/HDL Ratio 4.4 0 - 5.0     HEMOGLOBIN A1C WITH EAG    Collection Time: 06/22/20  3:23 AM   Result Value Ref Range    Hemoglobin A1c 6.4 (H) 4.2 - 5.6 %    Est. average glucose 137 mg/dL   MAGNESIUM    Collection Time: 06/22/20  3:23 AM   Result Value Ref Range    Magnesium 2.6 1.6 - 2.6 mg/dL   METABOLIC PANEL, BASIC    Collection Time: 06/22/20  3:23 AM   Result Value Ref Range    Sodium 138 136 - 145 mmol/L    Potassium 3.9 3.5 - 5.5 mmol/L    Chloride 106 100 - 111 mmol/L    CO2 26 21 - 32 mmol/L    Anion gap 6 3.0 - 18 mmol/L    Glucose 97 74 - 99 mg/dL    BUN 15 7.0 - 18 MG/DL    Creatinine 0.58 (L) 0.6 - 1.3 MG/DL    BUN/Creatinine ratio 26 (H) 12 - 20      GFR est AA >60 >60 ml/min/1.73m2    GFR est non-AA >60 >60 ml/min/1.73m2    Calcium 9.3 8.5 - 10.1 MG/DL   GLUCOSE, POC    Collection Time: 06/22/20  6:32 AM   Result Value Ref Range    Glucose (POC) 109 70 - 110 mg/dL         Radiology studies:   Brain MRI 22 June 2020:  negative for acute stroke, normal findings. Assessment:     Ms Viola Roe is a 50-year-old woman with past medical history of hypertension, hyperlipidemia, osteoarthritis admitted on 21st June after acute onset lightheadedness/unsteadiness and confusion in the setting of a systolic blood pressure in 230s. The patient neurological exam today is nonfocal.    Based on the clinical presentation the patient had a hypertensive emergency. The patient made a good recovery at this time.       Active Problems:    TIA (transient ischemic attack) (6/21/2020)      Vasovagal episode (6/21/2020)        Plan:     -I called her son  Dianne Gabriel at [phone] to inform that his mom is doing well however I suspect that distress was one of the contributing factor to increasing the blood pressure  -Resume the blood pressure medications goals of blood pressure 110-130/60-80 mmHg  -Daily exercises, low-salt diet, meditation techniques, mindfulness meditation for example, avoid stress as much as possible  -No other recommendations at this time, will sign off please call me if any questions          Lucius Beltrán MD  Adult Neurologist  6/22/2020

## 2020-06-22 NOTE — PROGRESS NOTES
Problem: Mobility Impaired (Adult and Pediatric)  Goal: *Acute Goals and Plan of Care (Insert Text)  Description: Physical Therapy Goals  Initiated 6/22/2020 and to be accomplished within 7 day(s)  1. Patient will move from supine to sit and sit to supine , scoot up and down and roll side to side in bed with modified independence. 2.  Patient will transfer from bed to chair and chair to bed with modified independence using the least restrictive device. 3.  Patient will perform sit to stand with modified independence. 4.  Patient will ambulate with supervision/set-up for 250 feet with the least restrictive device. 5.  Patient will ascend/descend 10 stairs with 1-2 handrail(s) with supervision/set-up. Prior Level of Function:   Patient was modified independence for all mobility including gait using a cane. Patient lives with son in a 2 story home 19 steps to bedroom B handrail assist.      Outcome: Progressing Towards Goal   PHYSICAL THERAPY EVALUATION    Patient: Edie Mata (60 y.o. female)  Date: 6/22/2020  Primary Diagnosis: Vasovagal episode [R55]  TIA (transient ischemic attack) [G45.9]        Precautions:   Fall    ASSESSMENT :  PT orders received and patient cleared by nursing to participate with therapy. Patient is a 60 y.o. female admitted to the hospital due to dizziness and HTN. Pt had loss of consciousness for 10 minutes. Pt is being followed by neurology in the hospital. Patient consents to PT evaluation and treatment. Pt supine in bed upon arrival. Pt's second language is English but was able to hold a conversation with the therapist today without a . She has mild weakness on L LE compared to R LE as well as some decreased coordination L LE mostly slower. Pt performing supine to and from sit standby assistance/supervision. Sit to stands contact guard assistance.  Gait mostly contact guard assistance/ hand held assistance but had 1 LOB requiring minimal assistance for recovery. Pt states she uses a cane at times so used hand held today but will benefit from using a cane next visit. Pt limited mostly by dizziness with gait today and did not perform stairs for safety. Pt's BP after gait 145/79. She reports dizziness decreased with rest. She states she sometimes has dizziness after getting up from laying down. Pt ended therapy supine in bed with all needs met. Recommending pt for Move with Me to increase OOB. Patient will benefit from skilled intervention to address the above impairments. Patient's rehabilitation potential is considered to be Good  Factors which may influence rehabilitation potential include:    []         None noted  []         Mental ability/status  []         Medical condition  [x]         Home/family situation and support systems  []         Safety awareness  []         Pain tolerance/management  []         Other:      PLAN :  Recommendations and Planned Interventions:    [x]           Bed Mobility Training             [x]    Neuromuscular Re-Education  [x]           Transfer Training                   []    Orthotic/Prosthetic Training  [x]           Gait Training                          [x]    Modalities  [x]           Therapeutic Exercises           [x]    Edema Management/Control  [x]           Therapeutic Activities            [x]    Family Training/Education  [x]           Patient Education  []           Other (comment):    Frequency/Duration: Patient will be followed by physical therapy 1-2 times per day/4-7 days per week to address goals. Discharge Recommendations: Home Health with supervision   Further Equipment Recommendations for Discharge: N/A     SUBJECTIVE:   Patient stated I have a friend named No.     OBJECTIVE DATA SUMMARY:     Past Medical History:   Diagnosis Date    Arthritis     Carpal tunnel syndrome     left    Diabetes (HCC)     Elevated cholesterol     Hypertension     Numbness     left hand     Shortness of breath Wrist pain     left     Past Surgical History:   Procedure Laterality Date    HX APPENDECTOMY      HX HYSTERECTOMY       Barriers to Learning/Limitations: yes;  language  Compensate with: Visual Cues, Verbal Cues, and Tactile Cues  Home Situation:  Home Situation  Home Environment: Private residence  One/Two Story Residence: Two story  # of Interior Steps: 23  Interior Rails: Both  Living Alone: No  Support Systems: Family member(s)  Patient Expects to be Discharged to[de-identified] Private residence  Current DME Used/Available at Home: [employer], [employer]  Critical Behavior:  Neurologic State: Alert  Orientation Level: Oriented X4  Cognition: Follows commands  Safety/Judgement: Fall prevention  Psychosocial  Patient Behaviors: Calm; Cooperative  Purposeful Interaction: Yes  Pt Identified Daily Priority: Clinical issues (comment)  Caritas Process: Nurture loving kindness  Caring Interventions: Therapeutic modalities  Therapeutic Modalities: Humor                 B LE Strength:    Strength: Generally decreased, functional(R LE 4+ to 5/5, L LE 4 to 4+/5)              B LE Tone & Sensation:   Tone: Normal          Sensation: Intact           B LE Range Of Motion:  AROM: Within functional limits                 Posture:  Posture (WDL): Within defined limits     Functional Mobility:  Bed Mobility:     Supine to Sit: Supervision  Sit to Supine: Supervision  Scooting: Supervision  Transfers:  Sit to Stand: Contact guard assistance   Stand to Sit: Contact guard assistance    Balance:   Sitting: Intact  Standing: Impaired; Without support  Standing - Static: Good;Fair  Standing - Dynamic : Fair    Ambulation/Gait Training:  Distance (ft): 50 Feet (ft)  Assistive Device: (HHA but will benefit from cane next visit)  Ambulation - Level of Assistance: Contact guard assistance;Minimal assistance(min A for balance recovery)  Gait Abnormalities: Decreased step clearance(1 LOB)  Base of Support: Center of gravity altered  Speed/Mary Kate: Slow  Step Length: Right shortened;Left shortened       Therapeutic Exercises:   Reviewed and performed ankle pumps to increase blood flow and circulation. Pain:  No pain noted before, during, or at end of session. Activity Tolerance:   fair  Please refer to the flowsheet for vital signs taken during this treatment. After treatment:   []         Patient left in no apparent distress sitting up in chair  [x]         Patient left in no apparent distress in bed  [x]         Call bell left within reach  [x]   Personal items in reach   [x]         Nursing notified Robin Wahl  []         Caregiver present  []         Bed/chair alarm activated  []         SCDs applied     COMMUNICATION/EDUCATION:   [x]         Role of Physical Therapy in the acute care setting. [x]         Fall prevention education was provided and the patient/caregiver indicated understanding. [x]         Patient/family have participated as able in goal setting and plan of care. [x]         Patient/family agree to work toward stated goals and plan of care. []         Patient understands intent and goals of therapy, but is neutral about his/her participation. []         Patient is unable to participate in goal setting/plan of care: ongoing with therapy staff. [x]         Out of bed with nursing assistance 3-5 times a day. []         Other:     Thank you for this referral.  Zenon Kent, PT, DPT   Time Calculation: 24 mins      Eval Complexity: History: MEDIUM  Complexity : 1-2 comorbidities / personal factors will impact the outcome/ POC Exam:HIGH Complexity : 4+ Standardized tests and measures addressing body structure, function, activity limitation and / or participation in recreation  Presentation: MEDIUM Complexity : Evolving with changing characteristics  Clinical Decision Making:Medium Complexity    Overall Complexity:MEDIUM

## 2020-06-22 NOTE — PROGRESS NOTES
Discharge order noted. No d/c needs. Pt's son will be picking her up to home.       NKECHI ZepedaN RN  Care Management  Pager: [phone]

## 2020-06-22 NOTE — PROGRESS NOTES
attempted to visit patient and was not able to do a spiritual assessment. Patient was not available.  Will follow up with patient  [id number] [facility] Department  [phone]

## 2020-06-22 NOTE — H&P
History & Physical    Patient: Bibi Corrigan MRN: [id number]  [facility]: [id number]    YOB: 1960  Age: 60 y.o. Sex: female      DOA: 6/21/2020    Chief Complaint   Patient presents with    Dizziness    Hypertension          HPI:     Bibi Corrigan is a 60 y.o. female with past medical hx of hypertension, dyslipidemia, diabetes who had noted over the past couple of days that her systolic blood pressure was around 156 or 160. She reports compliance with her medications. She was reports some life stressors with regard to a couple of family members. She was in the backyard with a couple of her family members, apparently in a heated discussion, when she went to give her son a hug she felt dizzy, she was shaking, and the next thing she knew her family was bringing sprinkling water on her face. She states she did not know she was going down, she has no other memory of the event. The family member at bedside reports that she lost consciousness for about 10 minutes. Patient denies being symptomatic from her high blood pressure over the past couple of days or feeling unwell. She ate breakfast per her usual this morning, there was no salt in her breakfast.  Family notes that upon EMS arrival her systolic blood pressure was 230. Patient denies any associated headache chest pain cough, denies COVID exposure. She does report she had a headache after the fall with associated left-sided weakness. She presented to [facility] ED, where she was noted to have blood pressure 166/84. CT head was negative. She did well on bedside swallow eval.  She was evaluated by tele-neurology, who felt her symptoms may be related to TIA, stroke, vasovagal syncope. Patient was recommended for admission. Patient's daughter at the bedside assists with the history and translation with patient's permission.   Patient and family report that left-sided weakness is unchanged, but pronator drift noted earlier on tele-neurologist exam has improved. Past Medical History:   Diagnosis Date    Arthritis     Carpal tunnel syndrome     left    Diabetes (HCC)     Elevated cholesterol     Hypertension     Numbness     left hand     Shortness of breath     Wrist pain     left       Past Surgical History:   Procedure Laterality Date    HX APPENDECTOMY      HX HYSTERECTOMY         Family History   Problem Relation Age of Onset    Cancer Sister     Liver Disease Brother        Social History     Socioeconomic History    Marital status:      Spouse name: Not on file    Number of children: Not on file    Years of education: Not on file    Highest education level: Not on file   Tobacco Use    Smoking status: Never Smoker    Smokeless tobacco: Never Used   Substance and Sexual Activity    Alcohol use: No    Drug use: No       Prior to Admission medications    Medication Sig Start Date End Date Taking? Authorizing Provider   HYDROcodone-acetaminophen (VICODIN) 5-300 mg tablet Take 1 Tab by mouth every four (4) hours as needed. Max Daily Amount: 6 Tabs. 10/30/17   Erin Brannon MD   simvastatin (ZOCOR) 10 mg tablet Take 20 mg by mouth nightly. Provider, Yeyo   hydroCHLOROthiazide (HYDRODIURIL) 25 mg tablet Take 12.5 mg by mouth daily. Michelle Wolfe MD   celecoxib (CELEBREX) 100 mg capsule Take  by mouth daily. Michelle Wolfe MD   cholecalciferol (VITAMIN D3) 1,000 unit cap Take  by mouth daily. Michelle Wolfe MD       No Known Allergies    Review of Systems  GENERAL: No fevers or chills. HEENT: No change in vision, no earache, tinnitus, sore throat or sinus congestion. NECK: No pain or stiffness. CARDIOVASCULAR: No chest pain or pressure. No palpitations. PULMONARY: No shortness of breath, cough or wheeze. GASTROINTESTINAL: No abdominal pain, nausea, vomiting or diarrhea, melena or bright red blood per rectum. GENITOURINARY: No urinary frequency, urgency, hesitancy or dysuria.    MUSCULOSKELETAL: No joint or muscle pain, no back pain, no recent trauma. DERMATOLOGIC: No rash, no itching, no lesions. NEUROLOGIC: + headache. + numbness to left hand. Left-sided weakness. PSYCHIATRIC: No depression, anxiety, mood disorder, no loss of interest in normal activity or change in sleep pattern. Physical Exam:     Physical Exam:  Visit Vitals  /78   Pulse 94   Temp 98.2 °F (36.8 °C)   Resp 19   SpO2 95%      O2 Device: Room air    Temp (24hrs), Av.1 °F (37.3 °C), Min:98.2 °F (36.8 °C), Max:99.9 °F (37.7 °C)       No intake/output data recorded. No intake/output data recorded. Awake alert and oriented x4. Lying in bed no acute distress  Normocephalic atraumatic pupils equally round and reactive to light. Face symmetrical.  Regular rate and rhythm  Clear to auscultation bilaterally  Soft nontender nondistended normoactive bowel sounds  No edema. Dorsalis pedis pulses 2+ bilaterally. Face symmetrical.  Tongue protrudes midline. Left upper extremity 4/5. Left lower extremity 4/5. Right upper extremity 5/5. Right lower extremity 5/5. No rash, no lesion. Labs Reviewed:    Recent Results (from the past 24 hour(s))   CBC WITH AUTOMATED DIFF    Collection Time: 20  6:32 PM   Result Value Ref Range    WBC 7.6 4.6 - 13.2 K/uL    RBC 4.58 4.20 - 5.30 M/uL    HGB 13.5 12.0 - 16.0 g/dL    HCT 40.8 35.0 - 45.0 %    MCV 89.1 74.0 - 97.0 FL    MCH 29.5 24.0 - 34.0 PG    MCHC 33.1 31.0 - 37.0 g/dL    RDW 13.4 11.6 - 14.5 %    PLATELET 223 373 - 960 K/uL    MPV 10.4 9.2 - 11.8 FL    NEUTROPHILS 64 40 - 73 %    LYMPHOCYTES 28 21 - 52 %    MONOCYTES 7 3 - 10 %    EOSINOPHILS 0 0 - 5 %    BASOPHILS 1 0 - 2 %    ABS. NEUTROPHILS 5.0 1.8 - 8.0 K/UL    ABS. LYMPHOCYTES 2.1 0.9 - 3.6 K/UL    ABS. MONOCYTES 0.5 0.05 - 1.2 K/UL    ABS. EOSINOPHILS 0.0 0.0 - 0.4 K/UL    ABS.  BASOPHILS 0.1 0.0 - 0.1 K/UL    DF AUTOMATED     METABOLIC PANEL, COMPREHENSIVE    Collection Time: 20  6:32 PM   Result Value Ref Range    Sodium 140 136 - 145 mmol/L    Potassium 3.9 3.5 - 5.5 mmol/L    Chloride 107 100 - 111 mmol/L    CO2 24 21 - 32 mmol/L    Anion gap 9 3.0 - 18 mmol/L    Glucose 106 (H) 74 - 99 mg/dL    BUN 15 7.0 - 18 MG/DL    Creatinine 0.74 0.6 - 1.3 MG/DL    BUN/Creatinine ratio 20 12 - 20      GFR est AA >60 >60 ml/min/1.73m2    GFR est non-AA >60 >60 ml/min/1.73m2    Calcium 9.5 8.5 - 10.1 MG/DL    Bilirubin, total 0.4 0.2 - 1.0 MG/DL    ALT (SGPT) 103 (H) 13 - 56 U/L    AST (SGOT) 70 (H) 10 - 38 U/L    Alk. phosphatase 110 45 - 117 U/L    Protein, total 8.4 (H) 6.4 - 8.2 g/dL    Albumin 4.1 3.4 - 5.0 g/dL    Globulin 4.3 (H) 2.0 - 4.0 g/dL    A-G Ratio 1.0 0.8 - 1.7     MAGNESIUM    Collection Time: 06/21/20  6:32 PM   Result Value Ref Range    Magnesium 2.4 1.6 - 2.6 mg/dL   CARDIAC PANEL,(CK, CKMB & TROPONIN)    Collection Time: 06/21/20  6:32 PM   Result Value Ref Range    CK - MB 1.0 <3.6 ng/ml    CK-MB Index 0.6 0.0 - 4.0 %     26 - 192 U/L    Troponin-I, QT <0.02 0.0 - 0.045 NG/ML   EKG, 12 LEAD, INITIAL    Collection Time: 06/21/20  6:50 PM   Result Value Ref Range    Ventricular Rate 92 BPM    Atrial Rate 92 BPM    P-R Interval 152 ms    QRS Duration 76 ms    Q-T Interval 364 ms    QTC Calculation (Bezet) 450 ms    Calculated P Axis 52 degrees    Calculated R Axis 28 degrees    Calculated T Axis 28 degrees    Diagnosis       Normal sinus rhythm  Normal ECG  No previous ECGs available         Procedures/imaging: see electronic medical records for all procedures/Xrays and details which were not copied into this note but were reviewed prior to creation of Plan        Assessment/Plan     1. Vasovagal episode  2. Acute ischemic stroke suspected, TIA less likely aspirin, statin, permissive hypertension, telemetry monitoring. Follow MRI, and neurology recs  3. Hypertension, with hypertensive urgency upon EMS arrival permissive hypertension. See #2.   4.  Prediabetes last A1c 5/1/2019 was 6.4, past higher values hold metformin. Sliding scale insulin. Check hemoglobin A1c.  5.  Dyslipidemiahold Zocor. Start Lipitor. Check fasting lipid profile. 6.  DVT prophylaxis  7.   Full code admit to telemetry    Time spent 70 minutes              Grecia Nascimento MD  June 21, 2020

## 2020-06-22 NOTE — PROGRESS NOTES
MRI Safety Screening form needs to be filled out and FAXED to [phone] MRI can be scheduled. If unable to acquire information from patient  [facility] must be contacted, or screening xrays will need to be ordered.     IF pt is Claustrophobic or will need Pain Meds please have these ordered in advance to help facilitate MRI exam.

## 2020-06-23 NOTE — PROGRESS NOTES
TRANSFER - IN REPORT:    Late Entry:Verbal report received from [place] [id number] on Vesta Chandlero  being received from the Emergency Dept. for routine progression of care      Report consisted of patients Situation, Background, Assessment and   Recommendations(SBAR). Information from the following report(s) SBAR was reviewed with the receiving nurse. Opportunity for questions and clarification was provided. Assessment completed upon patients arrival to unit and care assumed. 0700: Late Entry: Patient had an uneventful shift. Respiratory status, vital signs and MEWS score remained stable. Patient was resting quietly with no signs of distress noted. Bed locked and in lowest position. Call bell water and personal belongings were within reach. Patient had no questions, comments or concerns after bedside shift report.  Bedside report given to [place] [facility] RDENNIS

## 2020-09-23 ENCOUNTER — OFFICE VISIT (OUTPATIENT)
Dept: FAMILY MEDICINE CLINIC | Age: 60
End: 2020-09-23
Payer: COMMERCIAL

## 2020-09-23 VITALS
TEMPERATURE: 96.8 F | DIASTOLIC BLOOD PRESSURE: 83 MMHG | WEIGHT: 163.2 LBS | HEIGHT: 59 IN | BODY MASS INDEX: 32.9 KG/M2 | OXYGEN SATURATION: 95 % | SYSTOLIC BLOOD PRESSURE: 144 MMHG | HEART RATE: 95 BPM | RESPIRATION RATE: 18 BRPM

## 2020-09-23 DIAGNOSIS — M19.90 ARTHRITIS: Primary | ICD-10-CM

## 2020-09-23 PROCEDURE — 99203 OFFICE O/P NEW LOW 30 MIN: CPT | Performed by: NURSE PRACTITIONER

## 2020-09-23 RX ORDER — METFORMIN HYDROCHLORIDE 500 MG/1
TABLET ORAL 2 TIMES DAILY WITH MEALS
COMMUNITY
End: 2020-11-03 | Stop reason: SDUPTHER

## 2020-09-23 RX ORDER — LOSARTAN POTASSIUM 25 MG/1
TABLET ORAL DAILY
COMMUNITY
End: 2020-11-03 | Stop reason: SDUPTHER

## 2020-09-23 RX ORDER — DICLOFENAC SODIUM 10 MG/G
4 GEL TOPICAL 4 TIMES DAILY
Qty: 1 EACH | Refills: 1 | Status: SHIPPED | OUTPATIENT
Start: 2020-09-23 | End: 2021-01-05 | Stop reason: SDUPTHER

## 2020-09-23 RX ORDER — ACETAMINOPHEN 500 MG
TABLET ORAL
COMMUNITY
End: 2020-11-03 | Stop reason: SDUPTHER

## 2020-09-23 NOTE — PATIENT INSTRUCTIONS
Arthritis: Care Instructions Overview Arthritis, also called osteoarthritis, is a breakdown of the cartilage that cushions your joints. When the cartilage wears down, your bones rub against each other. This causes pain and stiffness. Many people have some arthritis as they age. Arthritis most often affects the joints of the spine, hands, hips, knees, or feet. Arthritis never goes away completely. But medicine and home treatment can help reduce pain and help you stay active. Follow-up care is a key part of your treatment and safety. Be sure to make and go to all appointments, and call your doctor if you are having problems. It's also a good idea to know your test results and keep a list of the medicines you take. How can you care for yourself at home? · Stay at a healthy weight. Being overweight puts extra strain on your joints. · Talk to your doctor or physical therapist about exercises that will help ease joint pain. ? Stretch. You may enjoy gentle forms of yoga to help keep your joints and muscles flexible. ? Walk instead of jog. Other types of exercise that are less stressful on the joints include riding a bike, swimming, temitope chi, or water exercise. ? Lift weights. Strong muscles help reduce stress on your joints. Stronger thigh muscles, for example, take some of the stress off of the knees and hips. Learn the right way to lift weights so you don't make joint pain worse. · Take your medicines exactly as prescribed. Call your doctor if you think you are having a problem with your medicine. · Take pain medicines exactly as directed. ? If the doctor gave you a prescription medicine for pain, take it as prescribed. ? If you are not taking a prescription pain medicine, ask your doctor if you can take an over-the-counter medicine. · Use a cane, crutch, walker, or another device if you need help to get around. These can help rest your joints.  You also can use other things to make life easier, such as a higher toilet seat and padded handles on kitchen utensils. · Do not sit in low chairs. They can make it hard to get up. · Put heat or cold on your sore joints as needed. Use whichever helps you most. You can also switch between hot and cold packs. ? Apply heat 2 or 3 times a day for 20 to 30 minutesusing a heating pad, hot shower, or hot packto relieve pain and stiffness. But don't use heat on a swollen joint. ? Put ice or a cold pack on your sore joint for 10 to 20 minutes at a time. Put a thin cloth between the ice and your skin. When should you call for help? Call your doctor now or seek immediate medical care if: 
  · You have sudden swelling, warmth, or pain in any joint.  
  · You have joint pain and a fever or rash.  
  · You have such bad pain that you cannot use a joint. Watch closely for changes in your health, and be sure to contact your doctor if: 
  · You have mild joint symptoms that continue even with more than 6 weeks of care at home.  
  · You have stomach pain or other problems with your medicine. Where can you learn more? Go to http://chio-cele.info/ Enter S739 in the search box to learn more about \"Arthritis: Care Instructions. \" Current as of: December 9, 2019               Content Version: 12.6 © 9540-7475 [employer]. Care instructions adapted under license by [employer] (which disclaims liability or warranty for this information). If you have questions about a medical condition or this instruction, always ask your healthcare professional. [place] [address] any warranty or liability for your use of this information.

## 2020-09-23 NOTE — PROGRESS NOTES
General Office Visit Note      Patient:  Trinh Marking    Subjective:     Chana Monahan is a 63 y.o. y.o. female who complains of   Chief Complaint   Patient presents with    New Patient     knee pain (bilateral)     Pt is c/o bilateral knee pain. She is with her daughter Yanna Blackburn. The pain has been present for 10 years with worsening for the past 5 years and worsened to the point of needing to be evaluated over the past 5-6 months. The pain is described sharp, and intermittent, worse in the morning with stiffness. Pt is very active and the pain is starting to impair her mobility. Pt has just acquired insurance, but [facility] states that they had her on Celebrex but she was since taken off due to her elevated blood pressure. She now only takes Tylenol but it only helps minimally. Past Medical History:   Diagnosis Date    Arthritis     Carpal tunnel syndrome     left    Diabetes (HCC)     Elevated cholesterol     Hypertension     Numbness     left hand     Shortness of breath     Wrist pain     left     Past Surgical History:   Procedure Laterality Date    HX APPENDECTOMY      HX HYSTERECTOMY       Social History     Socioeconomic History    Marital status:      Spouse name: Not on file    Number of children: Not on file    Years of education: Not on file    Highest education level: Not on file   Tobacco Use    Smoking status: Never Smoker    Smokeless tobacco: Never Used   Substance and Sexual Activity    Alcohol use: No    Drug use: No     Current Outpatient Medications   Medication Sig Dispense Refill    losartan (COZAAR) 25 mg tablet Take  by mouth daily.  metFORMIN (GLUCOPHAGE) 500 mg tablet Take  by mouth two (2) times daily (with meals).  acetaminophen (Tylenol Extra Strength) 500 mg tablet Take  by mouth every six (6) hours as needed for Pain.  diclofenac (VOLTAREN) 1 % gel Apply 4 g to affected area four (4) times daily.  1 Each 1    aspirin 81 mg chewable tablet Take 1 Tab by mouth daily. 30 Tab 0    atorvastatin (LIPITOR) 80 mg tablet Take 1 Tab by mouth nightly. 30 Tab 0    cholecalciferol (VITAMIN D3) 1,000 unit cap Take  by mouth daily. No Known Allergies  The patient has a family history of    REVIEW OF SYSTEMS  Review of Systems   Cardiovascular: Positive for leg swelling (knees ). Negative for chest pain. Musculoskeletal: Positive for joint pain. Objective:     Visit Vitals  BP (!) 144/83   Pulse 95   Temp 96.8 °F (36 °C) (Skin)   Resp 18   Ht 4' 11.45\" (1.51 m)   Wt 163 lb 3.2 oz (74 kg)   SpO2 95%   BMI 32.47 kg/m²       Current Outpatient Medications   Medication Instructions    acetaminophen (Tylenol Extra Strength) 500 mg tablet Oral, EVERY 6 HOURS AS NEEDED    aspirin 81 mg, Oral, DAILY    atorvastatin (LIPITOR) 80 mg, Oral, EVERY BEDTIME    cholecalciferol (VITAMIN D3) 1,000 unit cap Oral, DAILY    diclofenac (VOLTAREN) 4 g, Topical, 4 TIMES DAILY    losartan (COZAAR) 25 mg tablet Oral, DAILY    metFORMIN (GLUCOPHAGE) 500 mg tablet Oral, 2 TIMES DAILY WITH MEALS        PHYSICAL EXAM  Physical Exam  Vitals signs and nursing note reviewed. Constitutional:       Appearance: Normal appearance. HENT:      Head: Normocephalic and atraumatic. Neck:      Musculoskeletal: Normal range of motion and neck supple. Cardiovascular:      Rate and Rhythm: Normal rate and regular rhythm. Pulses: Normal pulses. Heart sounds: Normal heart sounds. Pulmonary:      Effort: Pulmonary effort is normal.      Breath sounds: Normal breath sounds. Musculoskeletal: Normal range of motion. General: Swelling (right knee) and tenderness (right knee) present. Right knee: She exhibits swelling. She exhibits normal range of motion. Tenderness found. Legs:    Skin:     General: Skin is warm and dry. Neurological:      General: No focal deficit present.       Mental Status: She is alert and oriented to person, place, and time. Psychiatric:         Mood and Affect: Mood normal.         Behavior: Behavior normal.         Assessment/Plan:     Diagnoses and all orders for this visit:    1. Arthritis  -     diclofenac (VOLTAREN) 1 % gel; Apply 4 g to affected area four (4) times daily. Follow-up and Dispositions    · Return in about 4 weeks (around 10/21/2020) for arthritis. Disclaimer:    I have discussed the diagnosis with the patient and the intended plan as seen above. The patient understands our medical plan. The risks, benefits and significant side effects of all medications have been reviewed. Anticipated time course and progression of condition reviewed. All questions have been addressed. She received an after visit summary, with information reviewed, and questions answered. Where appropriate, she is instructed to call the clinic if she has not been notified either by phone or through [address] with the results of her tests or with an appointment plan for any referrals within 1 week(s). The patient  is to call if her condition worsens or fails to improve or if significant side effects are experienced.        Gabrielle Marie NP

## 2020-09-23 NOTE — PROGRESS NOTES
Bridgette Bailey presents today for No chief complaint on file. Is someone accompanying this pt? yes    Is the patient using any DME equipment during OV? no    Depression Screening:  No flowsheet data found. Learning Assessment:  No flowsheet data found. Health Maintenance reviewed and discussed and ordered per Provider. Health Maintenance Due   Topic Date Due    Hepatitis C Screening  1960    Statin Therapy  1960    DTaP/Tdap/Td series (1 - Tdap) 08/31/1980    PAP AKA CERVICAL CYTOLOGY  08/31/1980    Shingrix Vaccine Age 50> (1 of 2) 08/31/2009    FOBT Q1Y Age 50-75  08/31/2009    Flu Vaccine (1) 09/01/2020   . Coordination of Care:  1. Have you been to the ER, urgent care clinic since your last visit? Hospitalized since your last visit? no    2. Have you seen or consulted any other health care providers outside of the [address] [place] [address] since your last visit? Include any pap smears or colon screening.  no

## 2020-10-22 ENCOUNTER — VIRTUAL VISIT (OUTPATIENT)
Dept: FAMILY MEDICINE CLINIC | Age: 60
End: 2020-10-22

## 2020-10-22 NOTE — PROGRESS NOTES
Attempted connection via telemedicine; No connection ever made; disconnected at 8:43 am; encounter to be closed. This encounter was created in error - please disregard.

## 2020-11-02 RX ORDER — ATORVASTATIN CALCIUM 80 MG/1
80 TABLET, FILM COATED ORAL
Qty: 30 TAB | Refills: 0 | Status: CANCELLED | OUTPATIENT
Start: 2020-11-02

## 2020-11-02 RX ORDER — LOSARTAN POTASSIUM 25 MG/1
25 TABLET ORAL DAILY
Qty: 30 TAB | Refills: 0 | Status: CANCELLED | OUTPATIENT
Start: 2020-11-02

## 2020-11-02 RX ORDER — METFORMIN HYDROCHLORIDE 500 MG/1
500 TABLET ORAL 2 TIMES DAILY WITH MEALS
Qty: 60 TAB | Refills: 0 | Status: CANCELLED | OUTPATIENT
Start: 2020-11-02

## 2020-11-02 NOTE — TELEPHONE ENCOUNTER
Patient's son asking for refills on these medications for his mother. He says they were last given at the free clinic but she is no longer able to fill them there. Please sign if appropriate. Last Visit: 9/23/20 with HERBIE Almendarez  Next Appointment: 11/3/20 with MD Alf He    Requested Prescriptions     Pending Prescriptions Disp Refills    atorvastatin (LIPITOR) 80 mg tablet 30 Tab 0     Sig: Take 1 Tab by mouth nightly.  metFORMIN (GLUCOPHAGE) 500 mg tablet 60 Tab 0     Sig: Take 1 Tab by mouth two (2) times daily (with meals).  losartan (COZAAR) 25 mg tablet 30 Tab 0     Sig: Take 1 Tab by mouth daily.

## 2020-11-03 ENCOUNTER — VIRTUAL VISIT (OUTPATIENT)
Dept: FAMILY MEDICINE CLINIC | Age: 60
End: 2020-11-03
Payer: COMMERCIAL

## 2020-11-03 DIAGNOSIS — M19.90 ARTHRITIS: ICD-10-CM

## 2020-11-03 DIAGNOSIS — E78.00 HYPERCHOLESTEROLEMIA: Primary | ICD-10-CM

## 2020-11-03 DIAGNOSIS — I10 ESSENTIAL HYPERTENSION: ICD-10-CM

## 2020-11-03 PROCEDURE — 99214 OFFICE O/P EST MOD 30 MIN: CPT | Performed by: FAMILY MEDICINE

## 2020-11-03 RX ORDER — ATORVASTATIN CALCIUM 80 MG/1
80 TABLET, FILM COATED ORAL
Qty: 90 TAB | Refills: 3 | Status: SHIPPED | OUTPATIENT
Start: 2020-11-03 | End: 2021-01-05 | Stop reason: SDUPTHER

## 2020-11-03 RX ORDER — LOSARTAN POTASSIUM 25 MG/1
25 TABLET ORAL DAILY
Qty: 90 TAB | Refills: 3 | Status: SHIPPED | OUTPATIENT
Start: 2020-11-03 | End: 2021-01-05 | Stop reason: SDUPTHER

## 2020-11-03 RX ORDER — METFORMIN HYDROCHLORIDE 500 MG/1
500 TABLET ORAL
Qty: 90 TAB | Refills: 3 | Status: SHIPPED | OUTPATIENT
Start: 2020-11-03 | End: 2021-01-05 | Stop reason: SDUPTHER

## 2020-11-03 RX ORDER — ACETAMINOPHEN 500 MG
500 TABLET ORAL
Qty: 120 TAB | Refills: 2 | Status: SHIPPED | OUTPATIENT
Start: 2020-11-03 | End: 2021-07-22 | Stop reason: ALTCHOICE

## 2020-11-03 NOTE — PROGRESS NOTES
Zenon Dunlap is a 63 y.o. female who was seen by synchronous (real-time) audio-video technology on 11/3/2020 for Hypertension; Diabetes; and Osteoarthritis        Assessment & Plan:   Diagnoses and all orders for this visit:    1. Hypercholesterolemia    2. Arthritis    3. Essential hypertension    Other orders  -     losartan (COZAAR) 25 mg tablet; Take 1 Tab by mouth daily. -     metFORMIN (GLUCOPHAGE) 500 mg tablet; Take 1 Tab by mouth daily (with breakfast). -     atorvastatin (LIPITOR) 80 mg tablet; Take 1 Tab by mouth nightly. -     acetaminophen (Tylenol Extra Strength) 500 mg tablet; Take 1 Tab by mouth every six (6) hours as needed for Pain. As above,  treatment plan as listed below'  Orders Placed This Encounter    losartan (COZAAR) 25 mg tablet    metFORMIN (GLUCOPHAGE) 500 mg tablet    atorvastatin (LIPITOR) 80 mg tablet    acetaminophen (Tylenol Extra Strength) 500 mg tablet     Follow-up and Dispositions    · Return in about 3 months (around 2/3/2021), or if symptoms worsen or fail to improve, for htn, Chol. This has been fully explained to the patient, who indicates understanding. 712  Subjective:    Pt has DM, and HTN and needs refills. She has recently been treated for OA knee; She takes tylenol as noted below; Pt needs metformin . lipitor and losartan. She needs refills on med. Pt has a h/o arthritis of the knee; Takes tylenol for this needs a refill    Daughter assists  with the history. Prior to Admission medications    Medication Sig Start Date End Date Taking? Authorizing Provider   losartan (COZAAR) 25 mg tablet Take 1 Tab by mouth daily. 11/3/20  Yes Kirsty Maldonado MD   metFORMIN (GLUCOPHAGE) 500 mg tablet Take 1 Tab by mouth daily (with breakfast). 11/3/20  Yes Kirsty Maldonado MD   atorvastatin (LIPITOR) 80 mg tablet Take 1 Tab by mouth nightly.  11/3/20  Yes Kirsty Maldonado MD   acetaminophen (Tylenol Extra Strength) 500 mg tablet Take 1 Tab by mouth every six (6) hours as needed for Pain. 11/3/20  Yes Trinh Preston MD   diclofenac (VOLTAREN) 1 % gel Apply 4 g to affected area four (4) times daily. 9/23/20   Jv Alvarado NP   aspirin 81 mg chewable tablet Take 1 Tab by mouth daily. 6/23/20   Tripp Villeda MD   cholecalciferol (VITAMIN D3) 1,000 unit cap Take  by mouth daily. Other, MD Michelle     Patient Active Problem List    Diagnosis Date Noted    TIA (transient ischemic attack) 06/21/2020    Vasovagal episode 06/21/2020     No Known Allergies  Past Medical History:   Diagnosis Date    Arthritis     Carpal tunnel syndrome     left    Diabetes ([address]     Elevated cholesterol     Hypertension     Numbness     left hand     Shortness of breath     Wrist pain     left     Past Surgical History:   Procedure Laterality Date    HX APPENDECTOMY      HX HYSTERECTOMY       Family History   Problem Relation Age of Onset    Cancer Sister     Liver Disease Brother      Social History     Tobacco Use    Smoking status: Never Smoker    Smokeless tobacco: Never Used   Substance Use Topics    Alcohol use: No       Review of Systems   Constitutional: Negative for chills and fever. Respiratory: Negative for shortness of breath. Cardiovascular: Negative for chest pain and palpitations. Objective:   No flowsheet data found. General: alert, cooperative, no distress   Mental  status: normal mood, behavior, speech, dress, motor activity, and thought processes, able to follow commands   HENT: NCAT   Neck: no visualized mass   Resp: no respiratory distress   Neuro: no gross deficits   Skin: no discoloration or lesions of concern on visible areas   Psychiatric: normal affect, consistent with stated mood, no evidence of hallucinations     Additional exam findings: none      We discussed the expected course, resolution and complications of the diagnosis(es) in detail.   Medication risks, benefits, costs, interactions, and alternatives were discussed as indicated. I advised her to contact the office if her condition worsens, changes or fails to improve as anticipated. She expressed understanding with the diagnosis(es) and plan. Elana Alves, who was evaluated through a patient-initiated, synchronous (real-time) audio-video encounter, and/or her healthcare decision maker, is aware that it is a billable service, with coverage as determined by her insurance carrier. She provided verbal consent to proceed: Yes, and patient identification was verified. It was conducted pursuant to the emergency declaration under the [facility], [address] [place] [address] authority and the [employer] Act. A caregiver was present when appropriate. Ability to conduct physical exam was limited. I was in the office. The patient was at home.       Krysten Patino MD

## 2020-11-08 NOTE — PATIENT INSTRUCTIONS

## 2020-12-22 ENCOUNTER — VIRTUAL VISIT (OUTPATIENT)
Dept: FAMILY MEDICINE CLINIC | Age: 60
End: 2020-12-22
Payer: COMMERCIAL

## 2020-12-22 DIAGNOSIS — I10 ESSENTIAL HYPERTENSION: Primary | ICD-10-CM

## 2020-12-22 DIAGNOSIS — U07.1 COVID-19: ICD-10-CM

## 2020-12-22 PROCEDURE — 99213 OFFICE O/P EST LOW 20 MIN: CPT | Performed by: NURSE PRACTITIONER

## 2020-12-22 NOTE — PROGRESS NOTES
Sobia Bethea is a 63 y.o. female who was seen by synchronous (real-time) audio-video technology on 12/22/2020 for Elevated Blood Pressure    Pt is being evaluated for elevated blood pressure issues for the past 11 days. Jose states blood pressure has been in the 180's/70's. Pt says she has been having dizziness and headaches, but denies CP or SOB. She does admit to swelling in her right foot and numbness in her left hand. Pt had an emergency room visit at [facility] for headache, and BP. Pt's daughter also says that pt was not eating for a few days after the visit. On this visit pt was noted to be COVID positive and they were unaware due to missed calls for results according to lab records. Pt was instructed to return to the hospital for any SOB, CP, or increase in symptoms. Pt was instructed to follow-up in 2 weeks for BP check after illness subsides. Assessment & Plan:   Diagnoses and all orders for this visit:    1. Essential hypertension    2. COVID-19      Follow-up and Dispositions    · Return in about 2 weeks (around 1/5/2021) for Hypertension (VV). Routing History      712  Subjective:       Prior to Admission medications    Medication Sig Start Date End Date Taking? Authorizing Provider   losartan (COZAAR) 25 mg tablet Take 1 Tab by mouth daily. 11/3/20  Yes Lulu Savage MD   metFORMIN (GLUCOPHAGE) 500 mg tablet Take 1 Tab by mouth daily (with breakfast). 11/3/20  Yes Lulu Savage MD   acetaminophen (Tylenol Extra Strength) 500 mg tablet Take 1 Tab by mouth every six (6) hours as needed for Pain. 11/3/20  Yes Lulu Savage MD   diclofenac (VOLTAREN) 1 % gel Apply 4 g to affected area four (4) times daily. 9/23/20  Yes Jp Mei NP   aspirin 81 mg chewable tablet Take 1 Tab by mouth daily. 6/23/20  Yes Idalia Mayes MD   cholecalciferol (VITAMIN D3) 1,000 unit cap Take  by mouth daily.    Yes Michelle Wolfe MD   atorvastatin (LIPITOR) 80 mg tablet Take 1 Tab by mouth nightly. 11/3/20   Odalis Johnson MD     Patient Active Problem List   Diagnosis Code    TIA (transient ischemic attack) G45.9    Vasovagal episode R55     Current Outpatient Medications   Medication Sig Dispense Refill    losartan (COZAAR) 25 mg tablet Take 1 Tab by mouth daily. 90 Tab 3    metFORMIN (GLUCOPHAGE) 500 mg tablet Take 1 Tab by mouth daily (with breakfast). 90 Tab 3    acetaminophen (Tylenol Extra Strength) 500 mg tablet Take 1 Tab by mouth every six (6) hours as needed for Pain. 120 Tab 2    diclofenac (VOLTAREN) 1 % gel Apply 4 g to affected area four (4) times daily. 1 Each 1    aspirin 81 mg chewable tablet Take 1 Tab by mouth daily. 30 Tab 0    cholecalciferol (VITAMIN D3) 1,000 unit cap Take  by mouth daily.  atorvastatin (LIPITOR) 80 mg tablet Take 1 Tab by mouth nightly. 90 Tab 3     No Known Allergies    ROS    Objective:   No flowsheet data found. General: alert, cooperative, no distress   Mental  status: normal mood, behavior, speech, dress, motor activity, and thought processes, able to follow commands   HENT: NCAT   Neck: no visualized mass   Resp: no respiratory distress   Neuro: no gross deficits   Skin: no discoloration or lesions of concern on visible areas   Psychiatric: normal affect, consistent with stated mood, no evidence of hallucinations     Additional exam findings: N/A      We discussed the expected course, resolution and complications of the diagnosis(es) in detail. Medication risks, benefits, costs, interactions, and alternatives were discussed as indicated. I advised her to contact the office if her condition worsens, changes or fails to improve as anticipated. She expressed understanding with the diagnosis(es) and plan.        Denae Caballero, who was evaluated through a patient-initiated, synchronous (real-time) audio-video encounter, and/or her healthcare decision maker, is aware that it is a billable service, with coverage as determined by her insurance carrier. She provided verbal consent to proceed: Yes, and patient identification was verified. It was conducted pursuant to the emergency declaration under the [address] [place] [address] and the [employer] Act. A caregiver was present when appropriate. Ability to conduct physical exam was limited. I was in the office. The patient was at home.       Cecil Landaverde NP

## 2021-02-05 ENCOUNTER — HOSPITAL ENCOUNTER (OUTPATIENT)
Dept: LAB | Age: 61
Discharge: HOME | End: 2021-02-05
Payer: COMMERCIAL

## 2021-02-05 ENCOUNTER — APPOINTMENT (OUTPATIENT)
Dept: FAMILY MEDICINE CLINIC | Age: 61
End: 2021-02-05

## 2021-02-05 DIAGNOSIS — R73.03 PREDIABETES: ICD-10-CM

## 2021-02-05 DIAGNOSIS — E78.00 HYPERCHOLESTEROLEMIA: ICD-10-CM

## 2021-02-05 DIAGNOSIS — Z13.0 SCREENING FOR DEFICIENCY ANEMIA: ICD-10-CM

## 2021-02-05 DIAGNOSIS — I10 ESSENTIAL HYPERTENSION: ICD-10-CM

## 2021-02-05 LAB
ANION GAP SERPL CALC-SCNC: 6 MMOL/L (ref 3–18)
BASOPHILS # BLD: 0 K/UL (ref 0–0.1)
BASOPHILS NFR BLD: 1 % (ref 0–2)
BUN SERPL-MCNC: 16 MG/DL (ref 7–18)
BUN/CREAT SERPL: 24 (ref 12–20)
CALCIUM SERPL-MCNC: 9.4 MG/DL (ref 8.5–10.1)
CHLORIDE SERPL-SCNC: 107 MMOL/L (ref 100–111)
CHOLEST SERPL-MCNC: 174 MG/DL
CO2 SERPL-SCNC: 27 MMOL/L (ref 21–32)
CREAT SERPL-MCNC: 0.68 MG/DL (ref 0.6–1.3)
CREAT UR-MCNC: 266 MG/DL (ref 30–125)
DIFFERENTIAL METHOD BLD: NORMAL
EOSINOPHIL # BLD: 0.1 K/UL (ref 0–0.4)
EOSINOPHIL NFR BLD: 1 % (ref 0–5)
ERYTHROCYTE [DISTWIDTH] IN BLOOD BY AUTOMATED COUNT: 13.6 % (ref 11.6–14.5)
EST. AVERAGE GLUCOSE BLD GHB EST-MCNC: 137 MG/DL
GLUCOSE SERPL-MCNC: 106 MG/DL (ref 74–99)
HBA1C MFR BLD: 6.4 % (ref 4.2–5.6)
HCT VFR BLD AUTO: 43 % (ref 35–45)
HDLC SERPL-MCNC: 53 MG/DL (ref 40–60)
HDLC SERPL: 3.3 {RATIO} (ref 0–5)
HGB BLD-MCNC: 13.6 G/DL (ref 12–16)
LDLC SERPL CALC-MCNC: 78.8 MG/DL (ref 0–100)
LIPID PROFILE,FLP: ABNORMAL
LYMPHOCYTES # BLD: 2.7 K/UL (ref 0.9–3.6)
LYMPHOCYTES NFR BLD: 42 % (ref 21–52)
MCH RBC QN AUTO: 29.5 PG (ref 24–34)
MCHC RBC AUTO-ENTMCNC: 31.6 G/DL (ref 31–37)
MCV RBC AUTO: 93.3 FL (ref 74–97)
MICROALBUMIN UR-MCNC: 2.72 MG/DL (ref 0–3)
MICROALBUMIN/CREAT UR-RTO: 10 MG/G (ref 0–30)
MONOCYTES # BLD: 0.6 K/UL (ref 0.05–1.2)
MONOCYTES NFR BLD: 8 % (ref 3–10)
NEUTS SEG # BLD: 3.1 K/UL (ref 1.8–8)
NEUTS SEG NFR BLD: 48 % (ref 40–73)
PLATELET # BLD AUTO: 341 K/UL (ref 135–420)
PMV BLD AUTO: 11.6 FL (ref 9.2–11.8)
POTASSIUM SERPL-SCNC: 4.6 MMOL/L (ref 3.5–5.5)
RBC # BLD AUTO: 4.61 M/UL (ref 4.2–5.3)
SODIUM SERPL-SCNC: 140 MMOL/L (ref 136–145)
TRIGL SERPL-MCNC: 211 MG/DL (ref ?–150)
VLDLC SERPL CALC-MCNC: 42.2 MG/DL
WBC # BLD AUTO: 6.5 K/UL (ref 4.6–13.2)

## 2021-02-05 PROCEDURE — 83036 HEMOGLOBIN GLYCOSYLATED A1C: CPT

## 2021-02-05 PROCEDURE — 80048 BASIC METABOLIC PNL TOTAL CA: CPT

## 2021-02-05 PROCEDURE — 85025 COMPLETE CBC W/AUTO DIFF WBC: CPT

## 2021-02-05 PROCEDURE — 82043 UR ALBUMIN QUANTITATIVE: CPT

## 2021-02-05 PROCEDURE — 80061 LIPID PANEL: CPT

## 2021-07-22 ENCOUNTER — VIRTUAL VISIT (OUTPATIENT)
Dept: FAMILY MEDICINE CLINIC | Age: 61
End: 2021-07-22
Payer: COMMERCIAL

## 2021-07-22 DIAGNOSIS — Z11.59 ENCOUNTER FOR HEPATITIS C SCREENING TEST FOR LOW RISK PATIENT: ICD-10-CM

## 2021-07-22 DIAGNOSIS — J34.89 NASAL PAIN: Primary | ICD-10-CM

## 2021-07-22 DIAGNOSIS — Z12.11 SCREEN FOR COLON CANCER: ICD-10-CM

## 2021-07-22 DIAGNOSIS — R68.89 ITCHY EYES: ICD-10-CM

## 2021-07-22 DIAGNOSIS — M19.90 ARTHRITIS: ICD-10-CM

## 2021-07-22 DIAGNOSIS — R73.03 PREDIABETES: ICD-10-CM

## 2021-07-22 DIAGNOSIS — Z12.31 ENCOUNTER FOR SCREENING MAMMOGRAM FOR MALIGNANT NEOPLASM OF BREAST: ICD-10-CM

## 2021-07-22 PROCEDURE — 99214 OFFICE O/P EST MOD 30 MIN: CPT | Performed by: NURSE PRACTITIONER

## 2021-07-22 RX ORDER — DEXTROMETHORPHAN HYDROBROMIDE, GUAIFENESIN 5; 100 MG/5ML; MG/5ML
650 LIQUID ORAL EVERY 8 HOURS
Qty: 180 TABLET | Refills: 3 | Status: SHIPPED | OUTPATIENT
Start: 2021-07-22

## 2021-07-22 RX ORDER — CETIRIZINE HCL 10 MG
10 TABLET ORAL
Qty: 90 TABLET | Refills: 0 | Status: SHIPPED | OUTPATIENT
Start: 2021-07-22 | End: 2021-11-02

## 2021-08-10 ENCOUNTER — HOSPITAL ENCOUNTER (OUTPATIENT)
Dept: MAMMOGRAPHY | Age: 61
Discharge: HOME | End: 2021-08-10
Attending: NURSE PRACTITIONER
Payer: COMMERCIAL

## 2021-08-10 DIAGNOSIS — Z12.31 ENCOUNTER FOR SCREENING MAMMOGRAM FOR MALIGNANT NEOPLASM OF BREAST: ICD-10-CM

## 2021-08-10 PROCEDURE — 77063 BREAST TOMOSYNTHESIS BI: CPT

## 2021-08-11 ENCOUNTER — TELEPHONE (OUTPATIENT)
Dept: FAMILY MEDICINE CLINIC | Age: 61
End: 2021-08-11

## 2021-08-18 DIAGNOSIS — R92.8 ABNORMAL MAMMOGRAM: Primary | ICD-10-CM

## 2021-10-12 ENCOUNTER — HOSPITAL ENCOUNTER (OUTPATIENT)
Dept: ULTRASOUND IMAGING | Age: 61
Discharge: HOME | End: 2021-10-12
Attending: NURSE PRACTITIONER
Payer: COMMERCIAL

## 2021-10-12 ENCOUNTER — HOSPITAL ENCOUNTER (OUTPATIENT)
Dept: MAMMOGRAPHY | Age: 61
Discharge: HOME | End: 2021-10-12
Attending: NURSE PRACTITIONER
Payer: COMMERCIAL

## 2021-10-12 DIAGNOSIS — R92.8 ABNORMAL MAMMOGRAM: ICD-10-CM

## 2021-10-12 PROCEDURE — 76642 ULTRASOUND BREAST LIMITED: CPT

## 2021-10-12 PROCEDURE — 77061 BREAST TOMOSYNTHESIS UNI: CPT

## 2021-11-01 DIAGNOSIS — R68.89 ITCHY EYES: ICD-10-CM

## 2021-11-02 NOTE — TELEPHONE ENCOUNTER
Last Visit: 7/22/21 with NP Thor Haynes  Next Appointment: 11/15/21 with HERBIE Haynes  Previous Refill Encounter(s): 7/22/21 #90    Requested Prescriptions     Pending Prescriptions Disp Refills    cetirizine (ZYRTEC) 10 mg tablet [Pharmacy Med Name: Cetirizine HCl 10 MG Oral Tablet] 90 Tablet 1     Sig: Take 1 tablet by mouth nightly

## 2021-11-05 RX ORDER — CETIRIZINE HCL 10 MG
10 TABLET ORAL
Qty: 90 TABLET | Refills: 1 | Status: SHIPPED | OUTPATIENT
Start: 2021-11-05

## 2022-03-04 ENCOUNTER — NURSE TRIAGE (OUTPATIENT)
Dept: OTHER | Facility: CLINIC | Age: 62
End: 2022-03-04

## 2022-03-04 NOTE — TELEPHONE ENCOUNTER
Received call from [facility] with [facility] Flag Complaint of high blood pressure. Patient disconnected call prior to [facility] No answer when called back. No routing for this encounter.

## 2022-03-19 PROBLEM — R55 VASOVAGAL EPISODE: Status: ACTIVE | Noted: 2020-06-21

## 2022-03-20 PROBLEM — G45.9 TIA (TRANSIENT ISCHEMIC ATTACK): Status: ACTIVE | Noted: 2020-06-21

## 2023-03-02 ENCOUNTER — TELEPHONE (OUTPATIENT)
Age: 63
End: 2023-03-02
